# Patient Record
Sex: FEMALE | URBAN - METROPOLITAN AREA
[De-identification: names, ages, dates, MRNs, and addresses within clinical notes are randomized per-mention and may not be internally consistent; named-entity substitution may affect disease eponyms.]

---

## 2019-11-14 ENCOUNTER — APPOINTMENT (OUTPATIENT)
Age: 69
Setting detail: DERMATOLOGY
End: 2019-11-18

## 2019-11-14 VITALS — RESPIRATION RATE: 18 BRPM | HEIGHT: 63 IN | WEIGHT: 222 LBS

## 2019-11-14 DIAGNOSIS — L40.0 PSORIASIS VULGARIS: ICD-10-CM

## 2019-11-14 PROCEDURE — OTHER TREATMENT REGIMEN: OTHER

## 2019-11-14 PROCEDURE — 99202 OFFICE O/P NEW SF 15 MIN: CPT

## 2019-11-14 PROCEDURE — OTHER MEDICATION COUNSELING: OTHER

## 2019-11-14 PROCEDURE — OTHER MIPS QUALITY: OTHER

## 2019-11-14 PROCEDURE — OTHER COUNSELING: OTHER

## 2019-11-14 PROCEDURE — OTHER PRESCRIPTION: OTHER

## 2019-11-14 RX ORDER — HYDROCORTISONE 25 MG/G
1 CREAM TOPICAL BID
Qty: 1 | Refills: 3 | Status: ERX | COMMUNITY
Start: 2019-11-14

## 2019-11-14 RX ORDER — FLUOCINONIDE 0.5 MG/ML
SOLUTION TOPICAL
Qty: 1 | Refills: 6 | Status: ERX | COMMUNITY
Start: 2019-11-14

## 2019-11-14 RX ORDER — CLOBETASOL PROPIONATE 0.5 MG/G
CREAM TOPICAL BID
Qty: 1 | Refills: 6 | Status: ERX | COMMUNITY
Start: 2019-11-14

## 2019-11-14 ASSESSMENT — LOCATION SIMPLE DESCRIPTION DERM
LOCATION SIMPLE: LEFT FOOT
LOCATION SIMPLE: LEFT ELBOW
LOCATION SIMPLE: LEFT PRETIBIAL REGION
LOCATION SIMPLE: RIGHT ELBOW

## 2019-11-14 ASSESSMENT — LOCATION DETAILED DESCRIPTION DERM
LOCATION DETAILED: LEFT ELBOW
LOCATION DETAILED: LEFT DISTAL PRETIBIAL REGION
LOCATION DETAILED: LEFT DORSAL FOOT
LOCATION DETAILED: RIGHT ELBOW

## 2019-11-14 ASSESSMENT — LOCATION ZONE DERM
LOCATION ZONE: FEET
LOCATION ZONE: LEG
LOCATION ZONE: ARM

## 2019-11-14 ASSESSMENT — BSA PSORIASIS: % BODY COVERED IN PSORIASIS: 6

## 2019-11-14 NOTE — PROCEDURE: MEDICATION COUNSELING
Xelaliciaz Pregnancy And Lactation Text: This medication is Pregnancy Category D and is not considered safe during pregnancy.  The risk during breast feeding is also uncertain.

## 2019-11-14 NOTE — PROCEDURE: TREATMENT REGIMEN
Initiate Regimen: Clobetasol cream,  apply a thin layer to affected areas of body BID for 1 week, stop for 1 week. Repeat cycle as needed.\\nHydrocortisone 2.5% cream to be applied topically to affected areas of face BID x 7 days, off 7 days. Repeat cycle for flares.\\nFluocinonide solution to be applied to affected areas of scalp BID x 7 days, off 7 days. Repeat cycle as needed. Start Regimen: Clobetasol cream,  apply a thin layer to affected areas of body BID for 1 week, stop for 1 week. Repeat cycle as needed.\\nHydrocortisone 2.5% cream to be applied topically to affected areas of face BID x 7 days, off 7 days. Repeat cycle for flares.\\nFluocinonide solution to be applied to affected areas of scalp BID x 7 days, off 7 days. Repeat cycle as needed.

## 2019-11-14 NOTE — PROCEDURE: MIPS QUALITY
Quality 226: Preventive Care And Screening: Tobacco Use: Screening And Cessation Intervention: Patient screened for tobacco use, is a smoker AND received Cessation Counseling
Additional Notes: The patient will be referred back to the primary care physician for BMI management.
Detail Level: Detailed
Quality 128: Preventive Care And Screening: Body Mass Index (Bmi) Screening And Follow-Up Plan: BMI is documented above normal parameters and a follow-up plan is documented

## 2019-11-14 NOTE — PROCEDURE: TREATMENT REGIMEN
Other Instructions: Discussed soaking feet in 4:1 water:vinegar mixture for 10-15 minutes 2 or 3 nights weekly. \\nWill re-evaluate in 3 months.